# Patient Record
Sex: FEMALE | Race: ASIAN | URBAN - METROPOLITAN AREA
[De-identification: names, ages, dates, MRNs, and addresses within clinical notes are randomized per-mention and may not be internally consistent; named-entity substitution may affect disease eponyms.]

---

## 2022-10-04 ENCOUNTER — OFFICE VISIT (OUTPATIENT)
Dept: URBAN - METROPOLITAN AREA CLINIC 15 | Facility: CLINIC | Age: 87
End: 2022-10-04
Payer: MEDICARE

## 2022-10-04 DIAGNOSIS — H57.11 OCULAR PAIN, RIGHT EYE: ICD-10-CM

## 2022-10-04 DIAGNOSIS — H17.12 CENTRAL CORNEAL OPACITY, LEFT EYE: Primary | ICD-10-CM

## 2022-10-04 DIAGNOSIS — Z96.1 PRESENCE OF INTRAOCULAR LENS: ICD-10-CM

## 2022-10-04 PROCEDURE — 92004 COMPRE OPH EXAM NEW PT 1/>: CPT | Performed by: OPTOMETRIST

## 2022-10-04 ASSESSMENT — VISUAL ACUITY: OS: 20/80

## 2022-10-04 ASSESSMENT — INTRAOCULAR PRESSURE: OS: 17

## 2022-10-04 NOTE — IMPRESSION/PLAN
Impression: Central corneal opacity, left eye: H17.12. 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  Unable to improve vision OS with manifest.

## 2022-10-04 NOTE — IMPRESSION/PLAN
Impression: Ocular pain, right eye: H57.11. Plan: Patient has blind painful eye. Patient has had prior consult with oculoplastics and refused enucleation. Recommended enucleation, patient would like to setup own appointment with  in [facility].